# Patient Record
Sex: MALE | Race: WHITE | NOT HISPANIC OR LATINO | Employment: FULL TIME | URBAN - METROPOLITAN AREA
[De-identification: names, ages, dates, MRNs, and addresses within clinical notes are randomized per-mention and may not be internally consistent; named-entity substitution may affect disease eponyms.]

---

## 2023-05-30 ENCOUNTER — TELEPHONE (OUTPATIENT)
Dept: PRIMARY CARE | Facility: CLINIC | Age: 63
End: 2023-05-30

## 2023-05-30 ENCOUNTER — OFFICE VISIT (OUTPATIENT)
Dept: PRIMARY CARE | Facility: CLINIC | Age: 63
End: 2023-05-30
Payer: COMMERCIAL

## 2023-05-30 VITALS
HEART RATE: 74 BPM | WEIGHT: 206 LBS | SYSTOLIC BLOOD PRESSURE: 120 MMHG | HEIGHT: 67 IN | TEMPERATURE: 97.5 F | BODY MASS INDEX: 32.33 KG/M2 | OXYGEN SATURATION: 96 % | DIASTOLIC BLOOD PRESSURE: 78 MMHG

## 2023-05-30 DIAGNOSIS — C18.7 MALIGNANT NEOPLASM OF SIGMOID COLON (MULTI): ICD-10-CM

## 2023-05-30 DIAGNOSIS — S76.912A MUSCLE STRAIN OF LEFT THIGH, INITIAL ENCOUNTER: ICD-10-CM

## 2023-05-30 DIAGNOSIS — M65.312 TRIGGER THUMB OF LEFT HAND: ICD-10-CM

## 2023-05-30 DIAGNOSIS — E53.8 VITAMIN B12 DEFICIENCY: ICD-10-CM

## 2023-05-30 DIAGNOSIS — L71.9 ROSACEA: ICD-10-CM

## 2023-05-30 DIAGNOSIS — R97.0 ELEVATED CEA: ICD-10-CM

## 2023-05-30 DIAGNOSIS — M25.552 LEFT HIP PAIN: Primary | ICD-10-CM

## 2023-05-30 DIAGNOSIS — E55.9 VITAMIN D DEFICIENCY: ICD-10-CM

## 2023-05-30 PROBLEM — R55 VASOVAGAL SYNCOPE: Status: ACTIVE | Noted: 2023-05-30

## 2023-05-30 PROBLEM — K64.8 INTERNAL HEMORRHOIDS: Status: ACTIVE | Noted: 2023-05-30

## 2023-05-30 PROCEDURE — 99214 OFFICE O/P EST MOD 30 MIN: CPT | Performed by: FAMILY MEDICINE

## 2023-05-30 RX ORDER — ERGOCALCIFEROL 1.25 MG/1
CAPSULE ORAL
COMMUNITY
Start: 2021-09-24

## 2023-05-30 RX ORDER — MICONAZOLE NITRATE 2 %
CREAM (GRAM) TOPICAL
COMMUNITY
Start: 2022-11-29 | End: 2024-03-13 | Stop reason: WASHOUT

## 2023-05-30 RX ORDER — LANOLIN ALCOHOL/MO/W.PET/CERES
1 CREAM (GRAM) TOPICAL DAILY
COMMUNITY

## 2023-05-30 RX ORDER — METRONIDAZOLE 7.5 MG/G
GEL TOPICAL 2 TIMES DAILY
Qty: 45 G | Refills: 2 | Status: SHIPPED | OUTPATIENT
Start: 2023-05-30 | End: 2024-03-13 | Stop reason: WASHOUT

## 2023-05-30 ASSESSMENT — PATIENT HEALTH QUESTIONNAIRE - PHQ9
2. FEELING DOWN, DEPRESSED OR HOPELESS: NOT AT ALL
SUM OF ALL RESPONSES TO PHQ9 QUESTIONS 1 AND 2: 0
1. LITTLE INTEREST OR PLEASURE IN DOING THINGS: NOT AT ALL

## 2023-05-30 NOTE — PROGRESS NOTES
Subjective   Patient ID: Ahsan Boss is a 62 y.o. male who presents for multiple complaintsf  HPI  Annual Exam (Left leg is hurting, felt something when he got out of the jeep, sore in thigh are and around hip and back of leg and groin area. Quad.  He cannot lay o the left hip because it hurts.  Happened 3 weeks ago.  He used a little roll on heat but nothing else.  5/10 pain at worst.  Worse if trying to kick something.     Jammed left thumb back and his nose turns red, He is starting to notice that the thumb on the left hand is getting stuck.  Can live with it    Bowel movement passed blood, has hemorrhoid internal. Had bright red blood.  Occurs a few times a year.  Last colonoscopy was 2 years ago.  History of colon cancer with sigmoid colon resection.  Has polyps as well.  Dr Menezes follows.         Review of Systems    Objective   Physical Exam  General: Patient is alert and oriented ×3 and appears in no acute distress. No respiratory distress.    Head: Atraumatic normocephalic.    Eyes: EOMI, PERRLA      Ears: Canals patent without any irritation, tympanic membranes without inflammation, no swelling, normal light reflex.    Nose: Nares patent. Turbinates are not swollen. No discharge.    Mouth: Normal mucosa. Moist. No erythema, exudates, tonsillar enlargement.    Neck: Normal range of motion, no masses.  Thyroid is palpable and normal in size without any nodules. No anterior cervical or posterior cervical adenopathy.    Heart: Regular rate and rhythm, no murmurs clicks or gallops    Lungs: Clear to auscultation bilaterally without any rhonchi rales or wheezing, lung sounds heard throughout all lung fields    Abdomen: Soft, nontender, no rigidity, rebound, guarding or organomegaly. Bowel sounds ×4 quadrants.    Musculoskeletal: Strength grossly intact at 5 out of 5 in the proximal and distal muscles of the of the lower extremities bilaterally.  Patient does have some pain with internal rotation at the left  hip.  There is tenderness to palpation over the hamstring and also over the quadricep muscle on the side.  Patient also has tenderness over the greater trochanter.  Negative straight leg raise test and the patient does have normal range of motion with no significant hamstring tension.    Nerves: Cranial nerves II through XII appear grossly intact and without deficit    Skin: Intact, dry, no rashes or erythema    Psych: Normal affect.  Assessment/Plan   Problem List Items Addressed This Visit       Malignant neoplasm of sigmoid colon (CMS/HCC)    Relevant Orders    CEA    CBC and Auto Differential    Comprehensive metabolic panel    Elevated CEA    Vitamin B12 deficiency    Relevant Orders    Vitamin B12    Vitamin D deficiency    Relevant Orders    Vitamin D 1,25 Dihydroxy     Other Visit Diagnoses       Left hip pain    -  Primary    Relevant Orders    XR hip left 2 or 3 views    XR lumbar spine complete 4+ views    Muscle strain of left thigh, initial encounter        Trigger thumb of left hand        Rosacea        Relevant Medications    metroNIDAZOLE (Metrogel) 0.75 % gel

## 2023-05-30 NOTE — TELEPHONE ENCOUNTER
Dr. Licea, pt's oncologist is retiring and Dr. Rodriguez is not taking new patients.  Since Ahsan only has 2 more years of CT's to receive, is he able to have you order them for this year and next Rather then look for a new oncologist else where?    If so, are you able to put an order into [facility] for a ct of the abdomen and pelvis for this year to be done??  Please advise?

## 2023-06-28 PROBLEM — M79.671 PAIN OF RIGHT HEEL: Status: ACTIVE | Noted: 2023-06-28

## 2023-06-28 PROBLEM — S79.919A HIP INJURY, INITIAL ENCOUNTER: Status: ACTIVE | Noted: 2023-06-28

## 2023-06-28 PROBLEM — H60.91 RIGHT OTITIS EXTERNA: Status: ACTIVE | Noted: 2023-06-28

## 2023-06-28 PROBLEM — M54.50 ACUTE RIGHT-SIDED LOW BACK PAIN WITHOUT SCIATICA: Status: ACTIVE | Noted: 2023-06-28

## 2023-06-28 PROBLEM — R53.83 MALAISE AND FATIGUE: Status: ACTIVE | Noted: 2023-06-28

## 2023-06-28 PROBLEM — R53.81 MALAISE AND FATIGUE: Status: ACTIVE | Noted: 2023-06-28
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
## 2023-06-28 PROBLEM — K62.89 RECTAL PAIN: Status: ACTIVE | Noted: 2023-06-28

## 2023-06-28 PROBLEM — Z85.048 HISTORY OF RECTAL CANCER: Status: ACTIVE | Noted: 2023-06-28

## 2023-06-29 ENCOUNTER — APPOINTMENT (OUTPATIENT)
Dept: PRIMARY CARE | Facility: CLINIC | Age: 63
End: 2023-06-29
Payer: COMMERCIAL

## 2023-06-29 NOTE — PROGRESS NOTES
Subjective   Patient ID: Ahsan Boss is a 62 y.o. male who presents for No chief complaint on file..  HPI    Review of Systems    Objective   Physical Exam    Assessment/Plan   Problem List Items Addressed This Visit    None  Plan: Findings and surveillance were discussed at length with him.  He will have CT scan of chest abdomen pelvis yearly for 2 more years.   Repeat colonoscopy with Dr Menezes 2025

## 2024-03-13 ENCOUNTER — OFFICE VISIT (OUTPATIENT)
Dept: GASTROENTEROLOGY | Facility: CLINIC | Age: 64
End: 2024-03-13
Payer: COMMERCIAL

## 2024-03-13 VITALS
WEIGHT: 181 LBS | BODY MASS INDEX: 28.41 KG/M2 | HEIGHT: 67 IN | SYSTOLIC BLOOD PRESSURE: 124 MMHG | DIASTOLIC BLOOD PRESSURE: 76 MMHG | HEART RATE: 92 BPM | OXYGEN SATURATION: 98 %

## 2024-03-13 DIAGNOSIS — K62.5 RECTAL BLEEDING: Primary | ICD-10-CM

## 2024-03-13 PROCEDURE — 99214 OFFICE O/P EST MOD 30 MIN: CPT | Performed by: PHYSICIAN ASSISTANT

## 2024-03-13 RX ORDER — POLYETHYLENE GLYCOL 3350, SODIUM SULFATE ANHYDROUS, SODIUM BICARBONATE, SODIUM CHLORIDE, POTASSIUM CHLORIDE 236; 22.74; 6.74; 5.86; 2.97 G/4L; G/4L; G/4L; G/4L; G/4L
4000 POWDER, FOR SOLUTION ORAL ONCE
Qty: 4000 ML | Refills: 0 | Status: SHIPPED | OUTPATIENT
Start: 2024-03-13 | End: 2024-03-13

## 2024-03-13 NOTE — PROGRESS NOTES
Subjective   Patient ID: Ahsan Boss is a 63 y.o. male who presents for Follow-up (Lov 1/4/22/Rectal bleeding/Last colon 2/2022).    Patient's PCP is Dr. Galdamez    [facility]: colon cancer    HPI  Patient was seen in 2019 for complaints of rectal bleeding and abnormal CT scan. He underwent colonoscopy at that time that showed a sigmoid adenocarcinoma. He underwent resection and 4 rounds of chemotherapy. He then had a repeat colonoscopy in 2020 that showed one tubular adenoma that was removed and healthy appearing anastomosis. He then had a colonoscopy in February 2022 in which 1 polyp was removed and internal hemorrhoids seen. Preparation of the colon was fair. Pathology showed a hyperplastic colon polyp.    Patient states that he is here to discuss rectal bleeding that has been ongoing for months. Patient sates that he has been having bright red blood with wiping and in the toilet water. The bleeding is intermittent. He continues to report some intermittent rectal pain. He also reports difficulty cleaning after a BM. He has some preparation H at home, used it intermittently.   Prior GI evaluation:  Colonoscopy 2/2022:1 hyperplastic colon polyp removed  Colonoscopy 10/2020: 1 polyp, internal hemorrhoids seen  Colonoscopy 8/2019: sigmoid colon cancer found    Review of Systems:  Constitutional: No reported fever, chills, or weight loss.  Skin: No reported icterus, lesions, or rash.  Eye: No reported itching, pain, vision changes.  Ear: No reported discharge, hearing loss, or pain.  Nose: No reported congestion, discharge, or epistaxis.  Mouth/throat: No reported dysphagia, hoarseness, or throat pain.  Resp: No reported cough, dyspnea, or wheezing.  Cardiovascular: No reported chest pain, lower extremity edema, or palpitations.   GI: +rectal pain, rectal bleeding  : No reported dysuria, hematuria, or frequency.  Neuro: No reported confusion, memory loss, headaches, or dizziness.  Psych: No reported anxiety, depression,  or insomnia.  Musculoskeletal: No reported arthralgia, joint swelling, or myalgias.  Heme/lymph: No reported easy bleeding or bruising, or swollen lymph nodes.  Endocrine: No reported cold/heat intolerance, polydipsia, or polyuria.     Medications:  Prior to Admission medications    Medication Sig Start Date End Date Taking? Authorizing Provider   cyanocobalamin (Vitamin B-12) 1,000 mcg tablet Take 1 tablet (1,000 mcg) by mouth once daily.   Yes Historical Provider, MD   ergocalciferol (Vitamin D-2) 1.25 MG (35231 UT) capsule Take by mouth. 9/24/21  Yes Historical Provider, MD   Golytely 236-22.74-6.74 -5.86 gram solution Take 4,000 mL by mouth 1 time for 1 dose. 3/13/24 3/13/24  Christina Uribe PA-C   metroNIDAZOLE (Metrogel) 0.75 % gel Apply topically 2 times a day. Apply twice daily to rosacea  Patient not taking: Reported on 3/13/2024 5/30/23 5/29/24  Austin Galdamez,    nicotine polacrilex (Nicorette) 2 mg gum Take by mouth. 11/29/22 3/13/24  Historical Provider, MD       Allergies:  Patient has no known allergies.    Past Medical History:  He has a past medical history of Abnormal findings on diagnostic imaging of other specified body structures (01/04/2022), Neoplasm of unspecified behavior of digestive system (09/10/2019), Other specified health status, and Personal history of other diseases of the digestive system (09/10/2019).    Past Surgical History:  He has a past surgical history that includes Other surgical history (07/25/2019) and Other surgical history (08/06/2019).    Social History:  He reports that he has been smoking cigarettes. He has a 5.00 pack-year smoking history. He has never used smokeless tobacco. He reports that he does not drink alcohol and does not use drugs.    Objective   Physical exam:  Constitutional: Well developed, well nourished 63 y.o. year old in no acute distress.   Skin: Warm and dry. Normal turgor. No rash, ulcer, trauma, jaundice.   Eyes: Pupils symmetric and reactive  "to light.  Respiratory: Clear to auscultation bilaterally. No wheezes, rhonchi, or rales heard.  Cardiovascular: Regular rate and rhythm. S1 and S2 appreciated and normal. No murmur, rub, or gallop heard.   Edema: No edema noted.  GI: Normal bowel sounds. Soft, non-distended, non-tender. No rebound or guarding present. No hepatomegaly or splenomegaly appreciated. Abdominal aorta not palpably abnormal.  Musculoskeletal: Limbs and Joints grossly normal. Full range of motion in major joint.   Neuro: Alert and oriented x 3. Cranial nerves 2-12 grossly intact.   Psych: Normal mood and affect.        Relevant Results Recent labs reviewed in the EMR.  Lab Results   Component Value Date    HGB 14.4 03/30/2022    HGB 13.8 09/23/2021    HGB 13.3 (L) 10/26/2020    MCV 95 03/30/2022    MCV 95 09/23/2021    MCV 94 10/26/2020     03/30/2022     09/23/2021     10/26/2020       No results found for: \"FERRITIN\", \"IRON\"    Lab Results   Component Value Date     12/06/2022    K 4.3 12/06/2022     12/06/2022    BUN 16 12/06/2022    CREATININE 0.82 12/06/2022       Lab Results   Component Value Date    BILITOT 0.3 12/06/2022     Lab Results   Component Value Date    ALT 14 12/06/2022    ALT 20 03/30/2022    ALT 19 09/23/2021    AST 14 12/06/2022    AST 14 03/30/2022    AST 16 09/23/2021    ALKPHOS 58 12/06/2022    ALKPHOS 58 03/30/2022    ALKPHOS 67 09/23/2021       No results found for: \"CRP\"    No results found for: \"CALPS\"    Radiology: Reviewed imaging reviewed in the EMR.  No results found.    Assessment/Plan   Problem List Items Addressed This Visit             ICD-10-CM    Rectal bleeding - Primary K62.5     Patient's rectal bleeding likely related to hemorrhoids. I discussed referral to general surgery, patient declined at this time. I also offered 2 week course of hydrocortisone if needed. Patient has a personal history of colon cancer however. I recommended a colonoscopy to ensure no return of " malignancy.          Relevant Medications    Golytely 236-22.74-6.74 -5.86 gram solution    Other Relevant Orders    Colonoscopy Diagnostic         Christina Uribe PA-C

## 2024-03-13 NOTE — ASSESSMENT & PLAN NOTE
Patient's rectal bleeding likely related to hemorrhoids. I discussed referral to general surgery, patient declined at this time. I also offered 2 week course of hydrocortisone if needed. Patient has a personal history of colon cancer however. I recommended a colonoscopy to ensure no return of malignancy.

## 2024-03-13 NOTE — PATIENT INSTRUCTIONS
Thank you for coming in for your appointment    -Get colonoscopy with Dr. Menezes    Call with questions or concerns.    Finasteride Pregnancy And Lactation Text: This medication is absolutely contraindicated during pregnancy. It is unknown if it is excreted in breast milk.

## 2024-04-12 ENCOUNTER — TELEPHONE (OUTPATIENT)
Dept: GASTROENTEROLOGY | Facility: CLINIC | Age: 64
End: 2024-04-12
Payer: COMMERCIAL

## 2024-04-12 NOTE — TELEPHONE ENCOUNTER
----- Message from Yana Fernandez MA sent at 4/12/2024  8:44 AM EDT -----  Regarding: RE: COLONOSCOPY  Letter mailed to the Patient to contact the Office  ----- Message -----  From: Yana Fernandez MA  Sent: 4/9/2024   1:47 PM EDT  To: [facility] [id number]  Subject: RE: COLONOSCOPY                                  Left message on machine to return call     ----- Message -----  From: Yana Fernandez MA  Sent: 4/1/2024  10:17 AM EDT  To: [facility] [id number]  Subject: RE: COLONOSCOPY                                  Left message on machine to return call     ----- Message -----  From: Yana Fernandez MA  Sent: 4/1/2024  12:00 AM EDT  To: [facility] [id number]  Subject: COLONOSCOPY                                      PATIENT NEEDS A MONDAY IN MAY IN ENDO WITH DR. LAU. MIRALAX PREP GIVEN TO PATIENT.

## 2024-06-17 ENCOUNTER — OFFICE VISIT (OUTPATIENT)
Dept: PRIMARY CARE | Facility: CLINIC | Age: 64
End: 2024-06-17
Payer: COMMERCIAL

## 2024-06-17 VITALS
BODY MASS INDEX: 27.78 KG/M2 | HEIGHT: 67 IN | WEIGHT: 177 LBS | TEMPERATURE: 97.7 F | SYSTOLIC BLOOD PRESSURE: 132 MMHG | DIASTOLIC BLOOD PRESSURE: 78 MMHG

## 2024-06-17 DIAGNOSIS — L02.416 ABSCESS OF LEFT LEG: Primary | ICD-10-CM

## 2024-06-17 PROCEDURE — 99213 OFFICE O/P EST LOW 20 MIN: CPT | Performed by: FAMILY MEDICINE

## 2024-06-17 PROCEDURE — 10160 PNXR ASPIR ABSC HMTMA BULLA: CPT | Performed by: FAMILY MEDICINE

## 2024-06-17 RX ORDER — DOXYCYCLINE 100 MG/1
100 CAPSULE ORAL 2 TIMES DAILY
Qty: 14 CAPSULE | Refills: 0 | Status: SHIPPED | OUTPATIENT
Start: 2024-06-17 | End: 2024-06-24

## 2024-06-17 ASSESSMENT — PATIENT HEALTH QUESTIONNAIRE - PHQ9
1. LITTLE INTEREST OR PLEASURE IN DOING THINGS: NOT AT ALL
SUM OF ALL RESPONSES TO PHQ9 QUESTIONS 1 AND 2: 0
2. FEELING DOWN, DEPRESSED OR HOPELESS: NOT AT ALL

## 2024-06-17 NOTE — PROGRESS NOTES
Subjective   Patient ID: Ahsan Boss is a 63 y.o. male who presents for Insect Bite (Thinks spider bite on left leg, 4-5 days ago/).  HPI  Left lateral thigh.  Has been red and swollen.  Tried ernestina salve with some relief.  Not taking anything.  Pain is growing.    Review of Systems    Objective   Physical Exam  General: Patient is alert and orient x 3 appears in no acute distress.  Minimal pain distress    Heart: Regular rate and rhythm no murmurs clicks or gallops    Lungs: Clear auscultation bilateral, Yeni wheezing    Skin: Left lateral thigh there is a 2 cm raised erythematous tender skin lesion with no drainage currently  Assessment/Plan   Problem List Items Addressed This Visit    None  Visit Diagnoses       Abscess of left leg    -  Primary    Relevant Medications    doxycycline (Vibramycin) 100 mg capsule        Patient was prepped and 4 mm punch biopsy was used to open up the abscess.  Bloody pus was drained.  Patient was instructed not to put any antibacterial ointment over.  We covered with gauze and bandages.  He tolerated the procedure well.  After he was checking out he did have a vasovagal episode.  Admits that he had not eaten this morning and has had this happen in the past.  We took the patient to our lab and have him set drink and have something to eat.    Placed on doxycycline

## 2024-09-11 ENCOUNTER — PREP FOR PROCEDURE (OUTPATIENT)
Dept: GASTROENTEROLOGY | Facility: CLINIC | Age: 64
End: 2024-09-11
Payer: COMMERCIAL

## 2024-09-11 RX ORDER — SODIUM CHLORIDE 9 MG/ML
20 INJECTION, SOLUTION INTRAVENOUS CONTINUOUS
Status: CANCELLED | OUTPATIENT
Start: 2024-09-11

## 2024-09-12 ENCOUNTER — ANESTHESIA (OUTPATIENT)
Dept: GASTROENTEROLOGY | Facility: HOSPITAL | Age: 64
End: 2024-09-12
Payer: COMMERCIAL

## 2024-09-12 ENCOUNTER — HOSPITAL ENCOUNTER (OUTPATIENT)
Dept: GASTROENTEROLOGY | Facility: HOSPITAL | Age: 64
Discharge: HOME | End: 2024-09-12
Payer: COMMERCIAL

## 2024-09-12 ENCOUNTER — ANESTHESIA EVENT (OUTPATIENT)
Dept: GASTROENTEROLOGY | Facility: HOSPITAL | Age: 64
End: 2024-09-12
Payer: COMMERCIAL

## 2024-09-12 VITALS
TEMPERATURE: 97.7 F | SYSTOLIC BLOOD PRESSURE: 114 MMHG | OXYGEN SATURATION: 97 % | RESPIRATION RATE: 13 BRPM | HEART RATE: 52 BPM | DIASTOLIC BLOOD PRESSURE: 72 MMHG

## 2024-09-12 DIAGNOSIS — K62.5 RECTAL BLEEDING: ICD-10-CM

## 2024-09-12 PROBLEM — G47.33 OSA (OBSTRUCTIVE SLEEP APNEA): Status: ACTIVE | Noted: 2024-09-12

## 2024-09-12 PROCEDURE — 2500000004 HC RX 250 GENERAL PHARMACY W/ HCPCS (ALT 636 FOR OP/ED): Performed by: NURSE ANESTHETIST, CERTIFIED REGISTERED

## 2024-09-12 PROCEDURE — 7100000010 HC PHASE TWO TIME - EACH INCREMENTAL 1 MINUTE

## 2024-09-12 PROCEDURE — 2500000005 HC RX 250 GENERAL PHARMACY W/O HCPCS: Performed by: NURSE ANESTHETIST, CERTIFIED REGISTERED

## 2024-09-12 PROCEDURE — 2500000004 HC RX 250 GENERAL PHARMACY W/ HCPCS (ALT 636 FOR OP/ED): Performed by: INTERNAL MEDICINE

## 2024-09-12 PROCEDURE — 3700000001 HC GENERAL ANESTHESIA TIME - INITIAL BASE CHARGE

## 2024-09-12 PROCEDURE — 3700000002 HC GENERAL ANESTHESIA TIME - EACH INCREMENTAL 1 MINUTE

## 2024-09-12 PROCEDURE — 45385 COLONOSCOPY W/LESION REMOVAL: CPT | Performed by: INTERNAL MEDICINE

## 2024-09-12 PROCEDURE — 7100000009 HC PHASE TWO TIME - INITIAL BASE CHARGE

## 2024-09-12 RX ORDER — LIDOCAINE HCL/PF 100 MG/5ML
SYRINGE (ML) INTRAVENOUS AS NEEDED
Status: DISCONTINUED | OUTPATIENT
Start: 2024-09-12 | End: 2024-09-12

## 2024-09-12 RX ORDER — GLYCOPYRROLATE 0.2 MG/ML
INJECTION INTRAMUSCULAR; INTRAVENOUS AS NEEDED
Status: DISCONTINUED | OUTPATIENT
Start: 2024-09-12 | End: 2024-09-12

## 2024-09-12 RX ORDER — PROPOFOL 10 MG/ML
INJECTION, EMULSION INTRAVENOUS AS NEEDED
Status: DISCONTINUED | OUTPATIENT
Start: 2024-09-12 | End: 2024-09-12

## 2024-09-12 RX ORDER — SODIUM CHLORIDE 9 MG/ML
20 INJECTION, SOLUTION INTRAVENOUS CONTINUOUS
Status: DISCONTINUED | OUTPATIENT
Start: 2024-09-12 | End: 2024-09-13 | Stop reason: HOSPADM

## 2024-09-12 SDOH — HEALTH STABILITY: MENTAL HEALTH: CURRENT SMOKER: 1

## 2024-09-12 ASSESSMENT — COLUMBIA-SUICIDE SEVERITY RATING SCALE - C-SSRS
6. HAVE YOU EVER DONE ANYTHING, STARTED TO DO ANYTHING, OR PREPARED TO DO ANYTHING TO END YOUR LIFE?: NO
1. IN THE PAST MONTH, HAVE YOU WISHED YOU WERE DEAD OR WISHED YOU COULD GO TO SLEEP AND NOT WAKE UP?: NO
2. HAVE YOU ACTUALLY HAD ANY THOUGHTS OF KILLING YOURSELF?: NO

## 2024-09-12 ASSESSMENT — PAIN - FUNCTIONAL ASSESSMENT: PAIN_FUNCTIONAL_ASSESSMENT: 0-10

## 2024-09-12 ASSESSMENT — PAIN SCALES - GENERAL
PAINLEVEL_OUTOF10: 0 - NO PAIN

## 2024-09-12 NOTE — ANESTHESIA PREPROCEDURE EVALUATION
Patient: Ahsan Boss    Procedure Information       Anesthesia Start Date/Time: 09/12/24 0821    Scheduled providers: Inder Michel MD    Procedure: COLONOSCOPY    Location: [facility]            Relevant Problems   Anesthesia (within normal limits)      Pulmonary   (+) LIA (obstructive sleep apnea)      GI   (+) Malignant neoplasm of sigmoid colon (Multi)   (+) Rectal bleeding      Liver   (+) Malignant neoplasm of sigmoid colon (Multi)       Clinical information reviewed:   Tobacco  Allergies  Meds   Med Hx  Surg Hx   Fam Hx  Soc Hx        NPO Detail:  NPO/Void Status  Carbohydrate Drink Given Prior to Surgery? : N  Date of Last Liquid: 09/11/24  Time of Last Liquid: 2100  Date of Last Solid: 09/10/24  Time of Last Solid: 1800  Last Intake Type: Clear fluids  Time of Last Void: 0700         Physical Exam    Airway  Mallampati: III     Cardiovascular - normal exam     Dental    Pulmonary - normal exam     Abdominal            Anesthesia Plan    History of general anesthesia?: yes  History of complications of general anesthesia?: no    ASA 2     MAC     The patient is a current smoker.  Patient was previously instructed to abstain from smoking on day of procedure.  Patient did not smoke on day of procedure.    Anesthetic plan and risks discussed with patient.  Use of blood products discussed with who consented to blood products.

## 2024-09-12 NOTE — H&P
Pre-sedation Evaluation:  Sedation Necessary For: Analgesia  Sedation to be Managed By: Anesthesia (Monitored Anesthesia Care/MAC)    History of Present Illness and Indication for Procedure      Ahsan Boss is a 63 y.o. male with a history of colon cancer who presents for colonoscopy to evaluate rectal bleeding.    He has a history of colon cancer that was diagnosed in 2019 and he underwent resection at that time. Repeat colonoscopy in 2020 showed an 8 mm descending polyp (TA on path) and another colonoscopy in 2022 showed a 5 mm rectal polyp (hyperplastic on path).    There is no family history of colorectal cancer.        NPO guidelines met: Yes         Review of Systems  Constitutional:  Negative for chills, fever and unexpected weight change.   HENT:  Negative for trouble swallowing.    Respiratory:  Negative for shortness of breath.    Cardiovascular:  Negative for chest pain.   Gastrointestinal:  As above.   Skin:  Negative for color change.       I performed a complete 10 point review of systems and it is negative except as noted in HPI or above. All other systems have been reviewed and are negative.      Patient Active Problem List   Diagnosis    Malignant neoplasm of sigmoid colon (Multi)    Elevated CEA    Internal hemorrhoids    Vitamin B12 deficiency    Vitamin D deficiency    Vasovagal syncope    Acute right-sided low back pain without sciatica    Dog bite    Hip injury, initial encounter    History of rectal cancer    Malaise and fatigue    Pain of right heel    Rectal pain    Right otitis externa    Rectal bleeding       Past Medical History:  He has a past medical history of Abnormal findings on diagnostic imaging of other specified body structures (01/04/2022), Neoplasm of unspecified behavior of digestive system (09/10/2019), Other specified health status, and Personal history of other diseases of the digestive system (09/10/2019).    Past Surgical History:  He has a past surgical history that  includes Other surgical history (07/25/2019) and Other surgical history (08/06/2019).      Social History:  He reports that he has been smoking cigarettes. He has a 5 pack-year smoking history. He has never used smokeless tobacco. He reports that he does not drink alcohol and does not use drugs.    Family History:  Family History   Problem Relation Name Age of Onset    Pulmonary embolism Mother      Parkinsonism Father      Hypertension Maternal Grandfather      Diabetes Maternal Grandfather      Heart disease Maternal Grandfather      COPD Paternal Grandfather          Allergies:  Patient has no known allergies.    Current Medications  Current Outpatient Medications on File Prior to Encounter   Medication Sig Dispense Refill    cyanocobalamin (Vitamin B-12) 1,000 mcg tablet Take 1 tablet (1,000 mcg) by mouth once daily.      ergocalciferol (Vitamin D-2) 1.25 MG (21438 UT) capsule Take by mouth.       No current facility-administered medications on file prior to encounter.         Last Recorded Vitals  There were no vitals taken for this visit.      Physical Exam  Vitals reviewed.   Constitutional:       General: He is not in acute distress.     Appearance: He is not ill-appearing.   HENT:      Head: Normocephalic and atraumatic.      Mouth/Throat:      Comments: Mallampati: II  Cardiovascular:      Rate and Rhythm: Normal rate and regular rhythm.      Pulses: Normal pulses.      Heart sounds: Normal heart sounds. No murmur heard.  Pulmonary:      Effort: Pulmonary effort is normal. No respiratory distress.   Abdominal:      General: Bowel sounds are normal.      Palpations: Abdomen is soft.      Tenderness: There is no abdominal tenderness.   Skin:     General: Skin is warm and dry.   Neurological:      General: No focal deficit present.      Mental Status: He is alert and oriented to person, place, and time.              Assessment/Plan     Colonoscopy in endo with MAC sedation, ASA 2        Level of Sedation:  Moderate Sedation  (Sedation medications to be delivered via monitored anesthesia care (MAC).     This evaluation serves as my H&P.     Outpatient medication list and allergies have been reviewed.  Pre-procedure/linda procedure antibiotics not needed.     Pre-procedure evaluation completed by physician.           Inder Michel MD

## 2024-09-12 NOTE — ANESTHESIA POSTPROCEDURE EVALUATION
Patient: Ahsan Boss    Procedure Summary       Date: 09/12/24 Room / Location: [facility]    Anesthesia Start: 0821 Anesthesia Stop: 0850    Procedure: COLONOSCOPY Diagnosis: Rectal bleeding    Scheduled Providers: Inder Michel MD Responsible Provider: JESUS Gonzales    Anesthesia Type: MAC ASA Status: 2            Anesthesia Type: MAC    Vitals Value Taken Time   BP 97/78 09/12/24 0858   Temp 36.1 °C (97 °F) 09/12/24 0848   Pulse 52 09/12/24 0858   Resp 14 09/12/24 0858   SpO2 98 % 09/12/24 0858       Anesthesia Post Evaluation    Patient location during evaluation: bedside  Patient participation: complete - patient participated  Level of consciousness: awake  Pain management: adequate  Airway patency: patent  Cardiovascular status: acceptable  Respiratory status: acceptable  Hydration status: acceptable  Postoperative Nausea and Vomiting: none    There were no known notable events for this encounter.

## 2024-09-12 NOTE — LETTER
"September 20, 2024     Ahsan Boss  [address] [place] [address]      Dear Mr. Boss:    Below are the results from your recent visit:      One polyp that I removed during your recent colonoscopy was a sessile serrated adenoma on pathology.  This type of polyp is not a cancer, but it is the type of polyp that could grow into a cancer over time.  Any polyps that were removed will not grow into a cancer, but having polyps like this can increase your risk of developing other polyps or eventually a cancer.  Because of that risk I would recommend that you have another colonoscopy in about 3 years to look for other polyps.        The other polyp that I removed during your recent colonoscopy was a hyperplastic polyp. Hyperplastic polyps are benign (not cancerous) and they are not considered \"pre-cancerous\" either.        If you have any other questions or concerns please do not hesitate to call me at my office at [phone].     Repeat Colonoscopy Interval: 3 years          Sincerely,        Inder Michel MD        Resulted Orders   Surgical Pathology Exam   Result Value Ref Range    Case Report       Surgical Pathology                                Case: [id number]                                  Authorizing Provider:  Inder Michel MD        Collected:           09/12/2024 0835              Ordering Location:     [facility]    Received:            09/12/2024 1209                                     Building                                                                     Pathologist:           Donald Guillen MD                                                                           Specimens:   A) - COLON -CECUM POLYP                                                                             B) - RECTUM POLYPECTOMY                                                                    FINAL DIAGNOSIS  " "       A. COLON -CECUM POLYP, BIOPSY:              -Fragments of sessile serrated adenoma.      B. RECTUM, POLYPECTOMY:              -Fragments of hyperplastic polyp.                  By the signature on this report, the individual or group listed as making the Final Interpretation/Diagnosis certifies that they have reviewed this case.       Clinical History       Rectal bleeding [K62.5]      Gross Description       A: Received in formalin, labeled with the patient's name and hospital number and \"cecum polyp\", are multiple fragments of tan, soft tissue aggregating to 2.1 x 0.4 x 0.2 cm. The specimen is submitted in toto in one cassette.  LMP  B: Received in formalin, labeled with the patient's name and hospital number and \"rectal polyp\", are 2 fragments of tan, soft tissue aggregating to 0.7 x 0.2 x 0.2 cm. The specimen is submitted in toto in one cassette.  LMP            "

## 2024-09-13 NOTE — ADDENDUM NOTE
Encounter addended by: Karolina Leslie RN on: 9/13/2024 1:47 PM   Actions taken: Contacts section saved, Flowsheet accepted

## 2024-09-20 LAB
LABORATORY COMMENT REPORT: NORMAL
PATH REPORT.FINAL DX SPEC: NORMAL
PATH REPORT.GROSS SPEC: NORMAL
PATH REPORT.RELEVANT HX SPEC: NORMAL
PATH REPORT.TOTAL CANCER: NORMAL

## 2024-11-06 ENCOUNTER — OFFICE VISIT (OUTPATIENT)
Dept: PRIMARY CARE | Facility: CLINIC | Age: 64
End: 2024-11-06
Payer: COMMERCIAL

## 2024-11-06 VITALS
OXYGEN SATURATION: 94 % | HEIGHT: 67 IN | HEART RATE: 65 BPM | WEIGHT: 183 LBS | BODY MASS INDEX: 28.72 KG/M2 | DIASTOLIC BLOOD PRESSURE: 79 MMHG | RESPIRATION RATE: 18 BRPM | SYSTOLIC BLOOD PRESSURE: 121 MMHG

## 2024-11-06 DIAGNOSIS — L02.416 ABSCESS OF LEFT LEG: Primary | ICD-10-CM

## 2024-11-06 PROCEDURE — 99213 OFFICE O/P EST LOW 20 MIN: CPT | Performed by: FAMILY MEDICINE

## 2024-11-06 PROCEDURE — 87205 SMEAR GRAM STAIN: CPT

## 2024-11-06 PROCEDURE — 87070 CULTURE OTHR SPECIMN AEROBIC: CPT

## 2024-11-06 PROCEDURE — 87186 SC STD MICRODIL/AGAR DIL: CPT

## 2024-11-06 PROCEDURE — 3008F BODY MASS INDEX DOCD: CPT | Performed by: FAMILY MEDICINE

## 2024-11-06 PROCEDURE — 87075 CULTR BACTERIA EXCEPT BLOOD: CPT

## 2024-11-06 PROCEDURE — 87077 CULTURE AEROBIC IDENTIFY: CPT

## 2024-11-06 PROCEDURE — 10160 PNXR ASPIR ABSC HMTMA BULLA: CPT | Performed by: FAMILY MEDICINE

## 2024-11-06 RX ORDER — DOXYCYCLINE 100 MG/1
100 CAPSULE ORAL 2 TIMES DAILY
Qty: 14 CAPSULE | Refills: 0 | Status: SHIPPED | OUTPATIENT
Start: 2024-11-06 | End: 2024-11-13

## 2024-11-06 NOTE — PROGRESS NOTES
Subjective   Patient ID: Ahsan Boss is a 64 y.o. male who presents for Insect Bite.  HPI  Felt something itching last week. It was towards the right groin.  Had a lump and had some liquid come out.  Has red raised nodule left groin that is painful  Review of Systems    Objective   Physical Exam  General: Patient is alert orient x 3 appears in no acute distress    Heart: Regular rate and rhythm no murmurs clicks or gallops    Lungs: Clear auscultation bilateral without rhonchi rales or wheezing    Skin: Right groin region there is erythematous raised nodule with white center  Assessment/Plan   Problem List Items Addressed This Visit    None  Abscess culture was sent  Placed on doxycycline  4 mm punch biopsy was used to open up the abscess.  Abscess was probed and drained.  Patient Toller procedure well.  Dressed with gauze and paper tape.  Instructed to dress with gauze as this is healing up and draining.

## 2024-11-09 LAB
BACTERIA SPEC CULT: ABNORMAL
GRAM STN SPEC: ABNORMAL
GRAM STN SPEC: ABNORMAL

## 2024-12-16 ENCOUNTER — APPOINTMENT (OUTPATIENT)
Dept: PRIMARY CARE | Facility: CLINIC | Age: 64
End: 2024-12-16
Payer: COMMERCIAL

## 2025-05-01 ENCOUNTER — APPOINTMENT (OUTPATIENT)
Dept: PRIMARY CARE | Facility: CLINIC | Age: 65
End: 2025-05-01
Payer: COMMERCIAL

## 2025-05-01 VITALS
BODY MASS INDEX: 30.61 KG/M2 | DIASTOLIC BLOOD PRESSURE: 82 MMHG | HEART RATE: 78 BPM | SYSTOLIC BLOOD PRESSURE: 124 MMHG | WEIGHT: 195 LBS | HEIGHT: 67 IN | OXYGEN SATURATION: 98 % | RESPIRATION RATE: 18 BRPM

## 2025-05-01 DIAGNOSIS — E53.8 VITAMIN B12 DEFICIENCY: ICD-10-CM

## 2025-05-01 DIAGNOSIS — Z12.5 SCREENING FOR PROSTATE CANCER: ICD-10-CM

## 2025-05-01 DIAGNOSIS — E55.9 VITAMIN D DEFICIENCY: ICD-10-CM

## 2025-05-01 DIAGNOSIS — R53.81 MALAISE AND FATIGUE: ICD-10-CM

## 2025-05-01 DIAGNOSIS — R53.83 MALAISE AND FATIGUE: ICD-10-CM

## 2025-05-01 DIAGNOSIS — R06.09 DOE (DYSPNEA ON EXERTION): ICD-10-CM

## 2025-05-01 DIAGNOSIS — G47.33 OSA (OBSTRUCTIVE SLEEP APNEA): ICD-10-CM

## 2025-05-01 DIAGNOSIS — R55 VASOVAGAL SYNCOPE: ICD-10-CM

## 2025-05-01 DIAGNOSIS — Z00.00 WELL ADULT EXAM: Primary | ICD-10-CM

## 2025-05-01 DIAGNOSIS — C18.7 MALIGNANT NEOPLASM OF SIGMOID COLON (MULTI): ICD-10-CM

## 2025-05-01 PROCEDURE — 3008F BODY MASS INDEX DOCD: CPT | Performed by: FAMILY MEDICINE

## 2025-05-01 PROCEDURE — 99396 PREV VISIT EST AGE 40-64: CPT | Performed by: FAMILY MEDICINE

## 2025-05-01 NOTE — PROGRESS NOTES
Subjective   Patient ID: Ahsan Boss is a 64 y.o. male who presents for Annual Exam (Episodes of lightheaded and dizzy, getting shortness of breath on exertion ).  HPI  History of Present Illness  The patient presents for evaluation of shortness of breath, smoking cessation, vision changes, ear infection, urinary frequency, and constipation.    Shortness of breath occurs during physical exertion, attributed to decreased activity levels. Physical exercise includes push-ups, but not to the extent of his previous daily workout regimen. A history of shallow breathing and occasional wheezing is reported by his wife. No chest pain or palpitations are noted.    Smoking a pipe minimally continues, with contemplation of cessation. Previous use of nicotine gum increased nicotine cravings.    Ophthalmological care has not been sought despite visual disturbances such as blurriness and double vision when reading or using a cell phone.    A history of recurrent ear infections in one ear is managed with ear drops, recently used again with relief.    Regular dental check-ups are maintained with good oral hygiene, brushing teeth twice daily. Significant coffee consumption has caused teeth discoloration. No difficulty or pain with swallowing is reported.    Nocturia necessitates 2 to 3 bathroom visits per night, predominantly twice. A glass of water is kept bedside due to dryness upon waking. Sleep pattern is irregular, often waking up around 1:00 AM after going to bed at 8:00 PM, and struggling to return to sleep until 2:00 or 3:00 AM. Typically wakes up again at 4:00 AM to use the bathroom. Approximately one pot of coffee is consumed daily, avoiding drinking after 2:00 PM.    Diet excludes red meat and includes a high intake of fish, chicken, turkey, fruits, and vegetables. Rectal cramps and gas buildup occur after consuming beans, severe enough to cause near-fainting episodes. Bowel movements have been irregular in recent days,  with only a small amount passed this morning. Low fiber intake and inadequate hydration are acknowledged.    Currently taking vitamin B12 and vitamin D supplements inconsistently. Considering the use of Shredz supplements.    Compliance with colonoscopy schedule is maintained, with the most recent procedure conducted in 09/2024. Advised to undergo colonoscopy every few years.    SOCIAL HISTORY  The patient is still smoking the pipe and takes a couple of hits a day. He is thinking about quitting. The patient smoked cigars on and off for 20 years and did not start smoking cigars until he was in his 30s.    FAMILY HISTORY  There are no new medical problems or cancers in the family.     Review of Systems    Objective      Physical Exam  Physical Exam  General: Patient is alert and oriented ×3 and appears in no acute distress. No respiratory distress.     Head: Atraumatic normocephalic.     Eyes: EOMI, PERRLA       Ears: Canals patent without any irritation, tympanic membranes without inflammation, no swelling, normal light reflex.     Nose: Nares patent. Turbinates are not swollen. No discharge.     Mouth: Normal mucosa. Moist. No erythema, exudates, tonsillar enlargement.     Neck: Normal range of motion, no masses.  Thyroid is palpable and normal in size without any nodules. No anterior cervical or posterior cervical adenopathy.     Heart: Regular rate and rhythm, no murmurs clicks or gallops     Lungs: Clear to auscultation bilaterally without any rhonchi rales or wheezing, lung sounds heard throughout all lung fields     Abdomen: Soft, nontender, no rigidity, rebound, guarding or organomegaly. Bowel sounds ×4 quadrants.     Musculoskeletal: Strength grossly intact at 5 out of 5 in the proximal and distal muscles of the of the lower extremities bilaterally.       Nerves: Cranial nerves II through XII appear grossly intact and without deficit     Skin: Intact, dry, no rashes or erythema     Psych: Normal  affect.  Assessment/Plan   Assessment & Plan  1. Shortness of breath.  - Reports shortness of breath, particularly with exertion, likely due to decreased physical activity.  - No increased mucus production or change in mucus color, increased coughing, or wheezing noted.  - Chest x-ray will be ordered to further investigate the cause.  - Advised to increase physical activity to improve overall respiratory function.    2. Smoking cessation.  - Currently smoking a pipe a couple of times a day and considering quitting.  - Nicotine gum discussed as an option to help with cravings, but concerns about increased nicotine intake from the gum were expressed.  - Advised to continue praying and consider using nicotine gum if needed.  - Encouraged to send a list of any natural supplements being taken for evaluation.    3. Vision changes.  - Reports blurry vision and double vision when reading or looking at his cell phone.  - No recent follow-up with an eye doctor.  - Advised to follow up with an eye doctor for a comprehensive eye exam.  - Vision changes over time discussed, emphasizing the importance of regular eye exams.    4. Ear infection.  - History of ear infections, recently used ear drops which provided relief.  - No current symptoms reported.  - Advised to continue preventive measures and follow up with regular dental visits to avoid further complications.  - No problems swallowing or pain with swallowing noted.    5. Health maintenance.  - Weight increased from 183 pounds in 11/2024 to 195 pounds currently, with a BMI of 30, indicating obesity.  - Advised to reduce coffee intake due to its diuretic effects, contributing to urinary frequency and dehydration.  - PSA test will be ordered due to reported changes in urination.  - Advised to increase physical activity, maintain a balanced diet rich in fruits and vegetables, and ensure adequate hydration.  - Due for vaccinations, including the shingles vaccine, which is  recommended for its preventive benefits and potential reduction in dementia risk.    6. Urinary frequency.  - Reports getting up 2-3 times a night to urinate.  - Advised to reduce fluid intake before bedtime and cut back on caffeine, especially coffee, as it may irritate the bladder and affect sleep.  - PSA test will be ordered to rule out prostate issues.  - Discussed the impact of caffeine on bladder irritation and sleep disturbances.    7. Constipation.  - Reports recent constipation and rectal cramps, especially after consuming beans.  - Advised to take a daily fiber supplement such as Metamucil, Benefiber, or psyllium husk.  - Recommended increasing water intake to at least 60 ounces per day.  - Discussed the importance of fiber and hydration for regular bowel movements.    8. Colon cancer screening.  - Had a colonoscopy in 09/2024, which found a serrated adenoma and hyperplastic polyp.  - Advised to follow up with Dr. Michel for ongoing monitoring and future colonoscopies as recommended.  - Emphasized the importance of regular screenings and follow-up for colon cancer prevention.     Problem List Items Addressed This Visit       Malignant neoplasm of sigmoid colon (Multi)    Vitamin B12 deficiency    Relevant Orders    CBC and Auto Differential    Comprehensive Metabolic Panel    TSH with reflex to Free T4 if abnormal    Vitamin B12    Hemoglobin A1C    Lipid Panel    XR chest 2 views    Vitamin D deficiency    Relevant Orders    Vitamin D 25-Hydroxy,Total (for eval of Vitamin D levels)    Vasovagal syncope    Malaise and fatigue    Relevant Orders    CBC and Auto Differential    Comprehensive Metabolic Panel    TSH with reflex to Free T4 if abnormal    Vitamin B12    Hemoglobin A1C    Lipid Panel    XR chest 2 views    LIA (obstructive sleep apnea)     Other Visit Diagnoses         Well adult exam    -  Primary      LUIS (dyspnea on exertion)        Relevant Orders    CBC and Auto Differential    Comprehensive  Metabolic Panel    TSH with reflex to Free T4 if abnormal    Vitamin B12    Hemoglobin A1C    Lipid Panel    XR chest 2 views      Screening for prostate cancer        Relevant Orders    Prostate Specific Antigen, Screen

## 2025-05-06 LAB
25(OH)D3+25(OH)D2 SERPL-MCNC: 29 NG/ML (ref 30–100)
ALBUMIN SERPL-MCNC: 4.7 G/DL (ref 3.6–5.1)
ALP SERPL-CCNC: 49 U/L (ref 35–144)
ALT SERPL-CCNC: 16 U/L (ref 9–46)
ANION GAP SERPL CALCULATED.4IONS-SCNC: 9 MMOL/L (CALC) (ref 7–17)
AST SERPL-CCNC: 21 U/L (ref 10–35)
BASOPHILS # BLD AUTO: 37 CELLS/UL (ref 0–200)
BASOPHILS NFR BLD AUTO: 0.6 %
BILIRUB SERPL-MCNC: 0.5 MG/DL (ref 0.2–1.2)
BUN SERPL-MCNC: 15 MG/DL (ref 7–25)
CALCIUM SERPL-MCNC: 9.1 MG/DL (ref 8.6–10.3)
CHLORIDE SERPL-SCNC: 105 MMOL/L (ref 98–110)
CHOLEST SERPL-MCNC: 239 MG/DL
CHOLEST/HDLC SERPL: 5.2 (CALC)
CO2 SERPL-SCNC: 27 MMOL/L (ref 20–32)
CREAT SERPL-MCNC: 0.84 MG/DL (ref 0.7–1.35)
EGFRCR SERPLBLD CKD-EPI 2021: 99 ML/MIN/1.73M2
EOSINOPHIL # BLD AUTO: 217 CELLS/UL (ref 15–500)
EOSINOPHIL NFR BLD AUTO: 3.5 %
ERYTHROCYTE [DISTWIDTH] IN BLOOD BY AUTOMATED COUNT: 13 % (ref 11–15)
EST. AVERAGE GLUCOSE BLD GHB EST-MCNC: 117 MG/DL
EST. AVERAGE GLUCOSE BLD GHB EST-SCNC: 6.5 MMOL/L
GLUCOSE SERPL-MCNC: 96 MG/DL (ref 65–99)
HBA1C MFR BLD: 5.7 %
HCT VFR BLD AUTO: 37.6 % (ref 38.5–50)
HDLC SERPL-MCNC: 46 MG/DL
HGB BLD-MCNC: 12.9 G/DL (ref 13.2–17.1)
LDLC SERPL CALC-MCNC: 175 MG/DL (CALC)
LYMPHOCYTES # BLD AUTO: 3063 CELLS/UL (ref 850–3900)
LYMPHOCYTES NFR BLD AUTO: 49.4 %
MCH RBC QN AUTO: 32.7 PG (ref 27–33)
MCHC RBC AUTO-ENTMCNC: 34.3 G/DL (ref 32–36)
MCV RBC AUTO: 95.4 FL (ref 80–100)
MONOCYTES # BLD AUTO: 409 CELLS/UL (ref 200–950)
MONOCYTES NFR BLD AUTO: 6.6 %
NEUTROPHILS # BLD AUTO: 2474 CELLS/UL (ref 1500–7800)
NEUTROPHILS NFR BLD AUTO: 39.9 %
NONHDLC SERPL-MCNC: 193 MG/DL (CALC)
PLATELET # BLD AUTO: 201 THOUSAND/UL (ref 140–400)
PMV BLD REES-ECKER: 11 FL (ref 7.5–12.5)
POTASSIUM SERPL-SCNC: 4.1 MMOL/L (ref 3.5–5.3)
PROT SERPL-MCNC: 6.8 G/DL (ref 6.1–8.1)
PSA SERPL-MCNC: 1.73 NG/ML
RBC # BLD AUTO: 3.94 MILLION/UL (ref 4.2–5.8)
SODIUM SERPL-SCNC: 141 MMOL/L (ref 135–146)
TRIGL SERPL-MCNC: 78 MG/DL
TSH SERPL-ACNC: 1.25 MIU/L (ref 0.4–4.5)
VIT B12 SERPL-MCNC: 350 PG/ML (ref 200–1100)
WBC # BLD AUTO: 6.2 THOUSAND/UL (ref 3.8–10.8)

## 2025-05-11 ENCOUNTER — HOSPITAL ENCOUNTER (OUTPATIENT)
Dept: RADIOLOGY | Facility: HOSPITAL | Age: 65
Discharge: HOME | End: 2025-05-11
Payer: COMMERCIAL

## 2025-05-11 DIAGNOSIS — R06.09 DOE (DYSPNEA ON EXERTION): ICD-10-CM

## 2025-05-11 DIAGNOSIS — R53.83 MALAISE AND FATIGUE: ICD-10-CM

## 2025-05-11 DIAGNOSIS — E53.8 VITAMIN B12 DEFICIENCY: ICD-10-CM

## 2025-05-11 DIAGNOSIS — R53.81 MALAISE AND FATIGUE: ICD-10-CM

## 2025-05-11 PROCEDURE — 71046 X-RAY EXAM CHEST 2 VIEWS: CPT | Performed by: RADIOLOGY

## 2025-05-11 PROCEDURE — 71046 X-RAY EXAM CHEST 2 VIEWS: CPT

## 2025-05-19 ENCOUNTER — TELEPHONE (OUTPATIENT)
Dept: PRIMARY CARE | Facility: CLINIC | Age: 65
End: 2025-05-19
Payer: COMMERCIAL

## 2025-05-19 NOTE — TELEPHONE ENCOUNTER
----- Message from Austin Galdamez sent at 5/15/2025 12:20 PM EDT -----  Please let the patient know that his vitamin D was low and I would suggest he take vitamin D3 2000 IUs daily with a meal.  His hemoglobin A1c was in the prediabetic range at 5.7 and I would suggest   that he follow more the Mediterranean diet, increase his exercise and activity, decrease sweets, simple carbohydrates, breads and pastas that he is eating.  Blood count shows that he is mildly   anemic.  Will recheck in the future.  Cholesterol was elevated significantly and this does show that he is at increased risk for heart attack.  I am going to get a coronary artery calcium score but I   would also suggest that he go on to atorvastatin.  Thyroid was normal, B12 was normal, PSA was normal, chest x-ray was normal, liver functions normal, kidney function normal.  Please make sure that he is amenable to starting the statin medication  ----- Message -----  From: [facility] Results In  Sent: 5/6/2025   8:00 AM EDT  To: Austin Galdamez, DO

## 2025-05-19 NOTE — TELEPHONE ENCOUNTER
I called Damion and let him know his results and what Dr. Galdamez suggested for him. He wanted me to talk to his wife Angela. I told Angela what Dr. Galdamez suggested and I also sent it to her MyChart. They will talk things over and let us know what they have decided.

## 2025-08-12 ENCOUNTER — TELEPHONE (OUTPATIENT)
Dept: PRIMARY CARE | Facility: CLINIC | Age: 65
End: 2025-08-12
Payer: COMMERCIAL

## 2025-11-05 ENCOUNTER — APPOINTMENT (OUTPATIENT)
Dept: PRIMARY CARE | Facility: CLINIC | Age: 65
End: 2025-11-05
Payer: COMMERCIAL